# Patient Record
Sex: FEMALE | Race: WHITE | Employment: FULL TIME | URBAN - METROPOLITAN AREA
[De-identification: names, ages, dates, MRNs, and addresses within clinical notes are randomized per-mention and may not be internally consistent; named-entity substitution may affect disease eponyms.]

---

## 2021-08-18 ENCOUNTER — HOSPITAL ENCOUNTER (EMERGENCY)
Age: 21
Discharge: HOME | End: 2021-08-18
Attending: EMERGENCY MEDICINE
Payer: MEDICAID

## 2021-08-18 VITALS
BODY MASS INDEX: 46.65 KG/M2 | TEMPERATURE: 98 F | OXYGEN SATURATION: 99 % | RESPIRATION RATE: 17 BRPM | HEART RATE: 82 BPM | DIASTOLIC BLOOD PRESSURE: 71 MMHG | SYSTOLIC BLOOD PRESSURE: 135 MMHG | HEIGHT: 65 IN | WEIGHT: 280 LBS

## 2021-08-18 DIAGNOSIS — J06.9 VIRAL URI WITH COUGH: Primary | ICD-10-CM

## 2021-08-18 LAB
SARS-COV-2, RAPID: NOT DETECTED
SPECIMEN DESCRIPTION: NORMAL

## 2021-08-18 PROCEDURE — 99283 EMERGENCY DEPT VISIT LOW MDM: CPT

## 2021-08-18 PROCEDURE — 87635 SARS-COV-2 COVID-19 AMP PRB: CPT

## 2021-08-18 ASSESSMENT — ENCOUNTER SYMPTOMS
SORE THROAT: 1
SHORTNESS OF BREATH: 0
TROUBLE SWALLOWING: 0
COLOR CHANGE: 0
DIARRHEA: 1
BLOOD IN STOOL: 0
CONSTIPATION: 0
COUGH: 1
VOMITING: 0
ABDOMINAL PAIN: 0
BACK PAIN: 0
NAUSEA: 0

## 2021-08-18 ASSESSMENT — PAIN DESCRIPTION - PAIN TYPE: TYPE: ACUTE PAIN

## 2021-08-18 ASSESSMENT — PAIN DESCRIPTION - LOCATION: LOCATION: CHEST

## 2021-08-18 ASSESSMENT — PAIN SCALES - GENERAL: PAINLEVEL_OUTOF10: 3

## 2021-08-19 NOTE — ED TRIAGE NOTES
Mode of arrival (squad #, walk in, police, etc) : [place]        Chief complaint(s): Concern for covid        Arrival Note (brief scenario, treatment PTA, etc). : Pt arrives to ED c/o cough and shortness of breath. Patient states that she has had her symptoms for the last couple of days. Patient has no known exposure to covid but states that she did attended and concert last week. Patient also reports fatigue         C= \"Have you ever felt that you should Cut down on your drinking? \"  No  A= \"Have people Annoyed you by criticizing your drinking? \"  No  G= \"Have you ever felt bad or Guilty about your drinking? \"  No  E= \"Have you ever had a drink as an Eye-opener first thing in the morning to steady your nerves or to help a hangover? \"  No      Deferred []      Reason for deferring: N/A    *If yes to two or more: probable alcohol abuse. *

## 2021-08-19 NOTE — ED PROVIDER NOTES
[address]  EMERGENCY DEPARTMENT ENCOUNTER    Pt Name: Spenser Henderson  MRN: [id number]  YOB: 2000  Date of evaluation:8/18/21  PCP: No primary care provider on file. CHIEF COMPLAINT       Chief Complaint   Patient presents with    Cough    Shortness of Breath    Concern For COVID-19       HISTORY OF PRESENT ILLNESS    Spenser Henderson is a 24 y.o. female who presents with a chief complaint of a cough, congestion, sore throat and diarrhea. Patient states her symptoms have been going for the past 3 days. She is concerned she has COVID-19. She is unvaccinated. No real chest pain or shortness of breath. She states she went to a concert several days ago. She is 11 weeks pregnant. Symptoms are acute. Symptoms are moderate. Nothing else make symptoms better or worse. Patient has no other complaints at this time. REVIEW OF SYSTEMS       Review of Systems   Constitutional: Positive for fatigue. Negative for chills and fever. HENT: Positive for congestion and sore throat. Negative for ear pain and trouble swallowing. Eyes: Negative for visual disturbance. Respiratory: Positive for cough. Negative for shortness of breath. Cardiovascular: Negative for chest pain, palpitations and leg swelling. Gastrointestinal: Positive for diarrhea. Negative for abdominal pain, blood in stool, constipation, nausea and vomiting. Genitourinary: Negative for dysuria and flank pain. Musculoskeletal: Positive for arthralgias. Negative for back pain, myalgias and neck pain. Skin: Negative for color change, rash and wound. Neurological: Negative for dizziness, weakness, light-headedness, numbness and headaches. Psychiatric/Behavioral: Negative for confusion. All other systems reviewed and are negative. Negative in 10 essential Systems except as mentioned above and in the HPI. PAST MEDICAL HISTORY   History reviewed. No pertinent past medical history.   None    SURGICAL HISTORY has no past surgical history on file. None    CURRENT MEDICATIONS       Previous Medications    No medications on file       ALLERGIES     is allergic to augmentin [amoxicillin-pot clavulanate] and ceclor [cefaclor]. FAMILY HISTORY     has no family status information on file. family history is not on file. SOCIAL HISTORY      reports that she has never smoked. She has never used smokeless tobacco. She reports previous alcohol use. She reports current drug use. Drug: Marijuana. PHYSICAL EXAM     INITIAL VITALS:  height is 5' 5\" (1.651 m) and weight is 280 lb (127 kg). Her oral temperature is 98 °F (36.7 °C). Her blood pressure is 135/71 and her pulse is 82. Her respiration is 17 and oxygen saturation is 99%. Physical Exam  Vitals and nursing note reviewed. Constitutional:       General: She is not in acute distress. HENT:      Head: Normocephalic and atraumatic. Eyes:      Conjunctiva/sclera: Conjunctivae normal.      Pupils: Pupils are equal, round, and reactive to light. Cardiovascular:      Rate and Rhythm: Normal rate and regular rhythm. Heart sounds: Normal heart sounds. No murmur heard. Pulmonary:      Effort: Pulmonary effort is normal. No respiratory distress. Breath sounds: Normal breath sounds. Abdominal:      General: Bowel sounds are normal. There is no distension. Palpations: Abdomen is soft. Tenderness: There is no abdominal tenderness. Musculoskeletal:         General: No tenderness. Cervical back: Neck supple. Lymphadenopathy:      Cervical: No cervical adenopathy. Skin:     General: Skin is warm and dry. Findings: No rash. Neurological:      Mental Status: She is alert and oriented to person, place, and time. Psychiatric:         Judgment: Judgment normal.           DIFFERENTIAL DIAGNOSIS/MDM:   80-year-old female presents with 3 days of upper respiratory tract infection symptoms.   She is afebrile, nontoxic, normal vital signs here. Oxygen saturation is 99% on room air. Lung exam is unremarkable. COVID-19 test is negative here. Likely with a viral upper respiratory tract infection. I did advise over-the-counter treatment for her. We did discuss things that were safe in pregnancy. Low suspicion for COVID-19 however test does not 100% which she does understand. Advised to return if any symptoms worsen. She is agreeable plan will be discharged home today. DIAGNOSTIC RESULTS     EKG: All EKG's are interpreted by the Emergency Department Physician who either signs or Co-signs this chart in the absence of a cardiologist.        RADIOLOGY:   I directly visualized the following  images and reviewed the radiologist interpretations:  No orders to display           ED BEDSIDE ULTRASOUND:      LABS:  Labs Reviewed   COVID-19, RAPID         EMERGENCY DEPARTMENT COURSE:   Vitals:    Vitals:    08/18/21 2120   BP: 135/71   Pulse: 82   Resp: 17   Temp: 98 °F (36.7 °C)   TempSrc: Oral   SpO2: 99%   Weight: 280 lb (127 kg)   Height: 5' 5\" (1.651 m)              CRITICALCARE:      CONSULTS:  None      PROCEDURES:      FINAL IMPRESSION      1.  Viral URI with cough            DISPOSITION/PLAN   DISPOSITION Decision To Discharge 08/18/2021 11:15:44 PM        PATIENT REFERRED TO:  [facility] ED  [facility] [address] [place] [address]  [phone]    As needed, If symptoms worsen      DISCHARGE MEDICATIONS:  New Prescriptions    No medications on file       (Please note that portions ofthis note were completed with a voice recognition program.  Efforts were made to edit the dictations but occasionally words are mis-transcribed.)    Vinay Renee DO  Attending Emergency Physician          Vinay Renee DO  08/18/21 9344

## 2021-08-31 ENCOUNTER — HOSPITAL ENCOUNTER (OUTPATIENT)
Age: 21
Discharge: HOME | End: 2021-08-31
Payer: MEDICAID

## 2021-08-31 LAB
-: ABNORMAL
ABO/RH: NORMAL
ABSOLUTE EOS #: 0 K/UL (ref 0–0.4)
ABSOLUTE IMMATURE GRANULOCYTE: ABNORMAL K/UL (ref 0–0.3)
ABSOLUTE LYMPH #: 2.3 K/UL (ref 1.2–5.2)
ABSOLUTE MONO #: 0.5 K/UL (ref 0.1–1.3)
AMORPHOUS: ABNORMAL
AMPHETAMINE SCREEN URINE: NEGATIVE
ANTIBODY SCREEN: NEGATIVE
BACTERIA: ABNORMAL
BARBITURATE SCREEN URINE: NEGATIVE
BASOPHILS # BLD: 0 % (ref 0–2)
BASOPHILS ABSOLUTE: 0 K/UL (ref 0–0.2)
BENZODIAZEPINE SCREEN, URINE: NEGATIVE
BILIRUBIN URINE: NEGATIVE
BLOOD BANK COMMENT: NORMAL
BUPRENORPHINE URINE: ABNORMAL
CANNABINOID SCREEN URINE: POSITIVE
CASTS UA: ABNORMAL /LPF
COCAINE METABOLITE, URINE: NEGATIVE
COLOR: ABNORMAL
COMMENT UA: ABNORMAL
CRYSTALS, UA: ABNORMAL /HPF
DIFFERENTIAL TYPE: ABNORMAL
EOSINOPHILS RELATIVE PERCENT: 0 % (ref 0–4)
EPITHELIAL CELLS UA: ABNORMAL /HPF
GLUCOSE ADMINISTRATION: NORMAL
GLUCOSE TOLERANCE SCREEN 50G: 125 MG/DL (ref 70–135)
GLUCOSE URINE: NEGATIVE
HCT VFR BLD CALC: 39.9 % (ref 36–46)
HEMOGLOBIN: 13.7 G/DL (ref 12–16)
HEPATITIS B SURFACE ANTIGEN: NONREACTIVE
HEPATITIS C ANTIBODY: NONREACTIVE
HIV AG/AB: NONREACTIVE
IMMATURE GRANULOCYTES: ABNORMAL %
KETONES, URINE: NEGATIVE
LEUKOCYTE ESTERASE, URINE: ABNORMAL
LYMPHOCYTES # BLD: 19 % (ref 25–45)
MCH RBC QN AUTO: 31.8 PG (ref 26–34)
MCHC RBC AUTO-ENTMCNC: 34.4 G/DL (ref 31–37)
MCV RBC AUTO: 92.6 FL (ref 80–100)
MDMA URINE: ABNORMAL
METHADONE SCREEN, URINE: NEGATIVE
METHAMPHETAMINE, URINE: ABNORMAL
MONOCYTES # BLD: 4 % (ref 2–8)
MUCUS: ABNORMAL
NITRITE, URINE: NEGATIVE
NRBC AUTOMATED: ABNORMAL PER 100 WBC
OPIATES, URINE: NEGATIVE
OTHER OBSERVATIONS UA: ABNORMAL
OXYCODONE SCREEN URINE: NEGATIVE
PDW BLD-RTO: 13.5 % (ref 11.5–14.9)
PH UA: 7 (ref 5–8)
PHENCYCLIDINE, URINE: NEGATIVE
PLATELET # BLD: 334 K/UL (ref 150–450)
PLATELET ESTIMATE: ABNORMAL
PMV BLD AUTO: 7.4 FL (ref 6–12)
PROPOXYPHENE, URINE: ABNORMAL
PROTEIN UA: NEGATIVE
RBC # BLD: 4.31 M/UL (ref 4–5.2)
RBC # BLD: ABNORMAL 10*6/UL
RBC UA: ABNORMAL /HPF
RENAL EPITHELIAL, UA: ABNORMAL /HPF
RUBV IGG SER QL: 31.3 IU/ML
SEG NEUTROPHILS: 77 % (ref 34–64)
SEGMENTED NEUTROPHILS ABSOLUTE COUNT: 9.3 K/UL (ref 1.3–9.1)
SPECIFIC GRAVITY UA: 1.02 (ref 1–1.03)
T. PALLIDUM, IGG: NONREACTIVE
TEST INFORMATION: ABNORMAL
TRICHOMONAS: ABNORMAL
TRICYCLIC ANTIDEPRESSANTS, UR: ABNORMAL
TURBIDITY: ABNORMAL
URINE HGB: NEGATIVE
UROBILINOGEN, URINE: ABNORMAL
WBC # BLD: 12.2 K/UL (ref 4.5–13.5)
WBC # BLD: ABNORMAL 10*3/UL
WBC UA: ABNORMAL /HPF
YEAST: ABNORMAL

## 2021-08-31 PROCEDURE — 82950 GLUCOSE TEST: CPT

## 2021-08-31 PROCEDURE — 87340 HEPATITIS B SURFACE AG IA: CPT

## 2021-08-31 PROCEDURE — 86850 RBC ANTIBODY SCREEN: CPT

## 2021-08-31 PROCEDURE — 36415 COLL VENOUS BLD VENIPUNCTURE: CPT

## 2021-08-31 PROCEDURE — 85025 COMPLETE CBC W/AUTO DIFF WBC: CPT

## 2021-08-31 PROCEDURE — 87086 URINE CULTURE/COLONY COUNT: CPT

## 2021-08-31 PROCEDURE — 86787 VARICELLA-ZOSTER ANTIBODY: CPT

## 2021-08-31 PROCEDURE — 86780 TREPONEMA PALLIDUM: CPT

## 2021-08-31 PROCEDURE — 87389 HIV-1 AG W/HIV-1&-2 AB AG IA: CPT

## 2021-08-31 PROCEDURE — 86803 HEPATITIS C AB TEST: CPT

## 2021-08-31 PROCEDURE — 86901 BLOOD TYPING SEROLOGIC RH(D): CPT

## 2021-08-31 PROCEDURE — 81001 URINALYSIS AUTO W/SCOPE: CPT

## 2021-08-31 PROCEDURE — 86762 RUBELLA ANTIBODY: CPT

## 2021-08-31 PROCEDURE — 86900 BLOOD TYPING SEROLOGIC ABO: CPT

## 2021-08-31 PROCEDURE — 80307 DRUG TEST PRSMV CHEM ANLYZR: CPT

## 2021-09-01 LAB — VZV IGG SER QL IA: 1.88

## 2021-09-02 LAB
CULTURE: NORMAL
Lab: NORMAL
SPECIMEN DESCRIPTION: NORMAL

## 2021-10-18 ENCOUNTER — HOSPITAL ENCOUNTER (EMERGENCY)
Age: 21
Discharge: HOME | End: 2021-10-18
Attending: EMERGENCY MEDICINE
Payer: COMMERCIAL

## 2021-10-18 VITALS
WEIGHT: 280 LBS | RESPIRATION RATE: 16 BRPM | OXYGEN SATURATION: 98 % | HEIGHT: 65 IN | SYSTOLIC BLOOD PRESSURE: 164 MMHG | HEART RATE: 94 BPM | DIASTOLIC BLOOD PRESSURE: 53 MMHG | TEMPERATURE: 97.5 F | BODY MASS INDEX: 46.65 KG/M2

## 2021-10-18 DIAGNOSIS — N30.00 ACUTE CYSTITIS WITHOUT HEMATURIA: Primary | ICD-10-CM

## 2021-10-18 DIAGNOSIS — R10.2 PELVIC PAIN: ICD-10-CM

## 2021-10-18 LAB
-: ABNORMAL
AMORPHOUS: ABNORMAL
BACTERIA: ABNORMAL
BILIRUBIN URINE: NEGATIVE
CASTS UA: ABNORMAL /LPF
COLOR: YELLOW
COMMENT UA: ABNORMAL
CRYSTALS, UA: ABNORMAL /HPF
EPITHELIAL CELLS UA: ABNORMAL /HPF
GLUCOSE URINE: NEGATIVE
KETONES, URINE: NEGATIVE
LEUKOCYTE ESTERASE, URINE: ABNORMAL
MUCUS: ABNORMAL
NITRITE, URINE: NEGATIVE
OTHER OBSERVATIONS UA: ABNORMAL
PH UA: 8 (ref 5–8)
PROTEIN UA: NEGATIVE
RBC UA: ABNORMAL /HPF
RENAL EPITHELIAL, UA: ABNORMAL /HPF
SPECIFIC GRAVITY UA: 1.02 (ref 1–1.03)
TRICHOMONAS: ABNORMAL
TURBIDITY: ABNORMAL
URINE HGB: NEGATIVE
UROBILINOGEN, URINE: NORMAL
WBC UA: ABNORMAL /HPF
YEAST: ABNORMAL

## 2021-10-18 PROCEDURE — 87086 URINE CULTURE/COLONY COUNT: CPT

## 2021-10-18 PROCEDURE — 81001 URINALYSIS AUTO W/SCOPE: CPT

## 2021-10-18 PROCEDURE — 99284 EMERGENCY DEPT VISIT MOD MDM: CPT

## 2021-10-18 RX ORDER — NITROFURANTOIN 25; 75 MG/1; MG/1
100 CAPSULE ORAL 2 TIMES DAILY
Qty: 14 CAPSULE | Refills: 0 | Status: SHIPPED | OUTPATIENT
Start: 2021-10-18 | End: 2021-10-25

## 2021-10-18 ASSESSMENT — PAIN SCALES - GENERAL: PAINLEVEL_OUTOF10: 3

## 2021-10-18 ASSESSMENT — PAIN DESCRIPTION - PAIN TYPE: TYPE: ACUTE PAIN

## 2021-10-18 ASSESSMENT — PAIN DESCRIPTION - LOCATION: LOCATION: ABDOMEN

## 2021-10-18 NOTE — ED PROVIDER NOTES
EMERGENCY DEPARTMENT ENCOUNTER   ATTENDING ATTESTATION     Pt Name: Hendrick Meckel  MRN: [id number]  [place] 2000  Date of evaluation: 10/18/21   Hendrick Meckel is a 22 y.o. female with CC: Abdominal Cramping and Pregnancy Problem (19 weeks)    MDM:   46-year-old female presents for lower abdominal pain, currently 19 weeks pregnant, denies any vaginal bleeding vaginal discharge denies any nausea or vomiting, on initial exam patient in no acute distress, nontender on exam, bedside ultrasound performed showing positive fetal movement with fetal heart rate of 150    UA was reviewed consistent with infection    Suspect pain likely due to round ligament pain as well as UTI, patient has OB appointment scheduled for next week, discussed with patient need for follow-up with her OB and strict return precautions, patient voiced understanding and is comfortable with plan and discharge home    Patient/Guardian was informed of their diagnosis and told to follow up with PCP & Ob/gyn in 1-3 days. Patient demonstrates understanding and agreement with the plan. They were given the opportunity to ask questions and those questions were answered to the best of our ability with the available information. Patient/Guardian told to return to the ED for any new, worsening, changing or persistent symptoms. This dictation was prepared using [employer] voice recognition software. CRITICAL CARE:       EKG: All EKG's are interpreted by the Emergency Department Physician who either signs or Co-signs this chart in the absence of a cardiologist.      RADIOLOGY:All plain film, CT, MRI, and formal ultrasound images (except ED bedside ultrasound) are read by the radiologist, see reports below, unless otherwise noted in MDM or here. No orders to display     LABS: All lab results were reviewed by myself, and all abnormals are listed below.   Labs Reviewed   URINE RT REFLEX TO CULTURE - Abnormal; Notable for the following components:       Result Value    Turbidity UA Cloudy (*)     Leukocyte Esterase, Urine LARGE (*)     All other components within normal limits   MICROSCOPIC URINALYSIS - Abnormal; Notable for the following components:    Bacteria, UA MODERATE (*)     All other components within normal limits   CULTURE, URINE     CONSULTS:  None  FINAL IMPRESSION      1. Acute cystitis without hematuria    2. Pelvic pain            PASTMEDICAL HISTORY   History reviewed. No pertinent past medical history. SURGICAL HISTORY     History reviewed. No pertinent surgical history. CURRENT MEDICATIONS       Discharge Medication List as of 10/18/2021 12:18 PM        ALLERGIES     is allergic to augmentin [amoxicillin-pot clavulanate] and ceclor [cefaclor]. FAMILY HISTORY     has no family status information on file. SOCIAL HISTORY       Social History     Tobacco Use    Smoking status: Never Smoker    Smokeless tobacco: Never Used   Substance Use Topics    Alcohol use: Not Currently    Drug use: Yes     Types: Marijuana       I personally evaluated and examined the patient in conjunction with the APC and agree with the assessment, treatment plan, and disposition of the patient as recorded by the APC. Stephen Fox DO  The care is provided during an unprecedented national emergency due to the novel coronavirus, COVID 19.   Attending Emergency Physician        Stephen Fox DO  10/18/21 5831

## 2021-10-18 NOTE — ED PROVIDER NOTES
[address] ED  eMERGENCY dEPARTMENT eNCOUnter      Pt Name: Diogo Lozano  MRN: [id number]  [place] 2000  Date of evaluation: 10/18/2021  Provider: Scottie De Leon PA-C    CHIEF COMPLAINT       Chief Complaint   Patient presents with    Abdominal Cramping    Pregnancy Problem     19 weeks           HISTORY OF PRESENT ILLNESS  (Location/Symptom, Timing/Onset, Context/Setting, Quality, Duration, Modifying Factors, Severity.)   Diogo Lozano is a 22 y.o. female who presents to the emergency department for evaluation of abdominal pain. Patient states she is 19 weeks pregnant and since Saturday she has been having intermittent bilateral sharp shooting abdominal pains. Patient states the pain is shooting to her pelvic region. She denies any nausea, vomiting, vaginal bleeding, vaginal discharge, dysuria, constipation, diarrhea, bloody stools, chest pain, shortness of breath, cough, fevers or chills. Patient does follow with an OB/GYN. States her next appointment is on October 27. This is patient's first pregnancy. No other complaints. Nursing Notes were reviewed. REVIEW OF SYSTEMS    (2-9 systems for level 4, 10 or more for level 5)     Review of Systems   abd pain      Except as noted above the remainder of the review of systems was reviewed and negative. PAST MEDICAL HISTORY   History reviewed. No pertinent past medical history. None otherwise stated in nurses notes    SURGICAL HISTORY     History reviewed. No pertinent surgical history. None otherwise stated in nurses notes    [address]       Discharge Medication List as of 10/18/2021 12:18 PM          ALLERGIES     Augmentin [amoxicillin-pot clavulanate] and Ceclor [cefaclor]    FAMILY HISTORY     History reviewed. No pertinent family history. No family status information on file. None otherwise stated in nurses notes    SOCIAL HISTORY      reports that she has never smoked.  She has never used smokeless tobacco. She reports previous alcohol use. She reports current drug use. Drug: Marijuana. lives at home with others     PHYSICAL EXAM    (up to 7 for level 4, 8 or more for level 5)     ED Triage Vitals [10/18/21 1011]   BP Temp Temp Source Pulse Resp SpO2 Height Weight   (!) 164/53 97.5 °F (36.4 °C) Oral 94 16 98 % 5' 5\" (1.651 m) 280 lb (127 kg)       Physical Exam   Nursing note and vitals reviewed. Constitutional: Oriented to person, place, and time and well-developed, well-nourished. Head: Normocephalic and atraumatic. Ear: External ears normal.   Nose: Nose normal and midline. Eyes: Conjunctivae and EOM are normal. Pupils are equal, round, and reactive to light. Neck: Normal range of motion. Neck supple. Throat: Posterior pharynx is without erythema or exudates, airway is patent, no swelling  Cardiovascular: Normal rate, regular rhythm, normal heart sounds and intact distal pulses. Pulmonary/Chest: Effort normal and breath sounds normal. No respiratory distress. No wheezes. No rales. No chest tenderness. Abdominal: Soft. Bowel sounds are normal. No distension and no mass. There is no tenderness. There is no rebound and no guarding. No rigidity. Musculoskeletal: Normal range of motion. Neurological: Alert and oriented to person, place, and time. GCS score is 15. Skin: Skin is warm and dry. No rash noted. No erythema. No pallor. Psychiatric: Mood, memory, affect and judgment normal.           DIAGNOSTIC RESULTS     EKG: All EKG's are interpreted by the Emergency Department Physician who either signs or Co-signs this chart in the absence of a cardiologist.        RADIOLOGY:   All plain film, CT, MRI, and formal ultrasound images (except ED bedside ultrasound) are read by the radiologist, see reports below, unless otherwise noted in MDM or here. No orders to display       No results found.           LABS:  Labs Reviewed   URINE RT REFLEX TO CULTURE - Abnormal; Notable for the following Will check a urine and a bedside ultrasound. Bedside US by Dr Katerin Bejarano reveals good fetal movement with FHR of 150bpm.    UA shows a large leukocytes with negative hemoglobin. No protein. Low concern for preeclampsia   She has allergic to Ceclor. Will start on Macrobid. SPECT patient's abdominal pain is from the UTI and round ligament pain. Low concern for any appendicitis, ovarian torsion, perforation, obstruction, cholecystitis. patient does have an OB/GYN appointment next week. Strict return instruction discussed with patient. She is agreeable plan. ED Medications administered this visit:  Medications - No data to display    New Prescriptions from this visit:    Discharge Medication List as of 10/18/2021 12:18 PM      START taking these medications    Details   nitrofurantoin, macrocrystal-monohydrate, (MACROBID) 100 MG capsule Take 1 capsule by mouth 2 times daily for 7 days, Disp-14 capsule, R-0Print             Follow-up:  [facility]  [address]  [address] [facility] [id number] [phone]  Call       [facility] ED  [facility] [address] [phone]            Final Impression:   1. Acute cystitis without hematuria    2.  Pelvic pain               (Please note that portions of this note were completed with a voice recognition program )        Nolberto Love, EN[place]  10/18/21 [address] Barbara Esposito PA-C  10/18/21 1231

## 2021-10-19 LAB
CULTURE: NORMAL
Lab: NORMAL
SPECIMEN DESCRIPTION: NORMAL

## 2021-10-26 ENCOUNTER — HOSPITAL ENCOUNTER (OUTPATIENT)
Age: 21
Discharge: HOME | End: 2021-10-26
Payer: COMMERCIAL

## 2021-10-26 PROCEDURE — 36415 COLL VENOUS BLD VENIPUNCTURE: CPT

## 2021-10-26 PROCEDURE — 82105 ALPHA-FETOPROTEIN SERUM: CPT

## 2021-10-28 LAB
AFP INTERPRETATION: NORMAL
AFP MOM: 0.6
AFP SPECIMEN: NORMAL
AFP: 25 NG/ML
DATE OF BIRTH: NORMAL
DATING METHOD: NORMAL
DETERMINED BY: NORMAL
DIABETIC: NO
DONOR EGG?: NORMAL
DUE DATE: NORMAL
ESTIMATED DUE DATE: NORMAL
FAMILY HISTORY NTD: NO
GESTATIONAL AGE: NORMAL
IN VITRO FERTILIZATION: NO
INSULIN REQ DIABETES: NO
LAST MENSTRUAL PERIOD: NORMAL
MATERNAL AGE AT EDD: 20.5 YR
MATERNAL WEIGHT: 285
MONOCHORIONIC TWINS: NO
NUMBER OF FETUSES: NORMAL
PATIENT WEIGHT UNITS: NORMAL
PATIENT WEIGHT: NORMAL
RACE (MATERNAL): NORMAL
RACE: NORMAL
REPEAT SPECIMEN?: NO
SMOKING: NO
SMOKING: NO
VALPROIC/CARBAMAZEP: NO
ZZ NTE CLEAN UP: HISTORY: NO

## 2022-01-03 ENCOUNTER — HOSPITAL ENCOUNTER (OUTPATIENT)
Age: 22
Discharge: HOME | End: 2022-01-03
Payer: COMMERCIAL

## 2022-01-03 LAB
-: ABNORMAL
ABSOLUTE BANDS #: 0.17 K/UL (ref 0–1)
ABSOLUTE EOS #: 0 K/UL (ref 0–0.4)
ABSOLUTE IMMATURE GRANULOCYTE: ABNORMAL K/UL (ref 0–0.3)
ABSOLUTE LYMPH #: 2.75 K/UL (ref 1.2–5.2)
ABSOLUTE MONO #: 0.69 K/UL (ref 0.1–1.3)
AMORPHOUS: ABNORMAL
ATYPICAL LYMPHOCYTE ABSOLUTE COUNT: 0.52 K/UL
ATYPICAL LYMPHOCYTES: 3 %
BACTERIA: ABNORMAL
BANDS: 1 % (ref 0–10)
BASOPHILS # BLD: 0 % (ref 0–2)
BASOPHILS ABSOLUTE: 0 K/UL (ref 0–0.2)
BILIRUBIN URINE: ABNORMAL
CASTS UA: ABNORMAL /LPF
COLOR: ABNORMAL
COMMENT UA: ABNORMAL
CRYSTALS, UA: ABNORMAL /HPF
DIFFERENTIAL TYPE: ABNORMAL
EOSINOPHILS RELATIVE PERCENT: 0 % (ref 0–4)
EPITHELIAL CELLS UA: ABNORMAL /HPF
GLUCOSE ADMINISTRATION: ABNORMAL
GLUCOSE TOLERANCE SCREEN 50G: 145 MG/DL (ref 70–135)
GLUCOSE URINE: NEGATIVE
HCT VFR BLD CALC: 37.2 % (ref 36–46)
HEMOGLOBIN: 12.5 G/DL (ref 12–16)
IMMATURE GRANULOCYTES: ABNORMAL %
KETONES, URINE: ABNORMAL
LEUKOCYTE ESTERASE, URINE: ABNORMAL
LYMPHOCYTES # BLD: 16 % (ref 25–45)
MCH RBC QN AUTO: 31.4 PG (ref 26–34)
MCHC RBC AUTO-ENTMCNC: 33.7 G/DL (ref 31–37)
MCV RBC AUTO: 93.1 FL (ref 80–100)
MONOCYTES # BLD: 4 % (ref 2–8)
MORPHOLOGY: ABNORMAL
MUCUS: ABNORMAL
NITRITE, URINE: NEGATIVE
NRBC AUTOMATED: ABNORMAL PER 100 WBC
OTHER OBSERVATIONS UA: ABNORMAL
PDW BLD-RTO: 13.9 % (ref 11.5–14.9)
PH UA: 6 (ref 5–8)
PLATELET # BLD: 381 K/UL (ref 150–450)
PLATELET ESTIMATE: ABNORMAL
PMV BLD AUTO: 7.7 FL (ref 6–12)
PROTEIN UA: NEGATIVE
RBC # BLD: 4 M/UL (ref 4–5.2)
RBC # BLD: ABNORMAL 10*6/UL
RBC UA: ABNORMAL /HPF
RENAL EPITHELIAL, UA: ABNORMAL /HPF
SEG NEUTROPHILS: 76 % (ref 34–64)
SEGMENTED NEUTROPHILS ABSOLUTE COUNT: 13.07 K/UL (ref 1.3–9.1)
SPECIFIC GRAVITY UA: 1.02 (ref 1–1.03)
TRICHOMONAS: ABNORMAL
TURBIDITY: ABNORMAL
URINE HGB: NEGATIVE
UROBILINOGEN, URINE: ABNORMAL
WBC # BLD: 17.2 K/UL (ref 4.5–13.5)
WBC # BLD: ABNORMAL 10*3/UL
WBC UA: ABNORMAL /HPF
YEAST: ABNORMAL

## 2022-01-03 PROCEDURE — 82950 GLUCOSE TEST: CPT

## 2022-01-03 PROCEDURE — 87086 URINE CULTURE/COLONY COUNT: CPT

## 2022-01-03 PROCEDURE — 36415 COLL VENOUS BLD VENIPUNCTURE: CPT

## 2022-01-03 PROCEDURE — 81001 URINALYSIS AUTO W/SCOPE: CPT

## 2022-01-03 PROCEDURE — 85025 COMPLETE CBC W/AUTO DIFF WBC: CPT

## 2022-01-04 LAB
CULTURE: NORMAL
Lab: NORMAL
SPECIMEN DESCRIPTION: NORMAL

## 2022-01-12 ENCOUNTER — HOSPITAL ENCOUNTER (OUTPATIENT)
Age: 22
Discharge: HOME | End: 2022-01-12
Payer: COMMERCIAL

## 2022-01-12 LAB
AMOUNT GLUCOSE GIVEN: 100 G
GLUCOSE FASTING: 95 MG/DL (ref 65–99)
GLUCOSE TOLERANCE TEST 1 HOUR: 163 MG/DL (ref 65–184)
GLUCOSE TOLERANCE TEST 2 HOUR: 114 MG/DL (ref 65–139)
GLUCOSE TOLERANCE TEST 3 HOUR: 102 MG/DL (ref 65–130)

## 2022-01-12 PROCEDURE — 82951 GLUCOSE TOLERANCE TEST (GTT): CPT

## 2022-01-12 PROCEDURE — 82952 GTT-ADDED SAMPLES: CPT

## 2022-01-12 PROCEDURE — 36415 COLL VENOUS BLD VENIPUNCTURE: CPT
